# Patient Record
Sex: MALE | Race: BLACK OR AFRICAN AMERICAN | Employment: FULL TIME | URBAN - METROPOLITAN AREA
[De-identification: names, ages, dates, MRNs, and addresses within clinical notes are randomized per-mention and may not be internally consistent; named-entity substitution may affect disease eponyms.]

---

## 2018-09-18 ENCOUNTER — APPOINTMENT (OUTPATIENT)
Dept: GENERAL RADIOLOGY | Age: 28
End: 2018-09-18
Attending: EMERGENCY MEDICINE
Payer: SELF-PAY

## 2018-09-18 ENCOUNTER — HOSPITAL ENCOUNTER (EMERGENCY)
Age: 28
Discharge: HOME | End: 2018-09-18
Attending: EMERGENCY MEDICINE
Payer: SELF-PAY

## 2018-09-18 VITALS
WEIGHT: 184.3 LBS | DIASTOLIC BLOOD PRESSURE: 75 MMHG | HEART RATE: 59 BPM | RESPIRATION RATE: 16 BRPM | SYSTOLIC BLOOD PRESSURE: 133 MMHG | OXYGEN SATURATION: 100 % | HEIGHT: 76 IN | TEMPERATURE: 98.4 F | BODY MASS INDEX: 22.44 KG/M2

## 2018-09-18 DIAGNOSIS — R07.81 RIB PAIN ON LEFT SIDE: Primary | ICD-10-CM

## 2018-09-18 PROCEDURE — 99282 EMERGENCY DEPT VISIT SF MDM: CPT

## 2018-09-18 PROCEDURE — 71101 X-RAY EXAM UNILAT RIBS/CHEST: CPT

## 2018-09-18 RX ORDER — IBUPROFEN 800 MG/1
800 TABLET ORAL
Qty: 20 TAB | Refills: 0 | Status: SHIPPED | OUTPATIENT
Start: 2018-09-18 | End: 2018-09-25

## 2018-09-18 RX ORDER — HYDROCODONE BITARTRATE AND ACETAMINOPHEN 5; 325 MG/1; MG/1
1 TABLET ORAL
Qty: 10 TAB | Refills: 0 | Status: SHIPPED | OUTPATIENT
Start: 2018-09-18

## 2018-09-18 NOTE — DISCHARGE INSTRUCTIONS
[facility] SYSTEMS Departments     For adult and child immunizations, family planning, TB screening, STD testing and women's health services. [facility]: [place] [phone]      [facility] 25   [address]   [address]   170 [facility]: Elier Cadena [address] [facility] [address] [phone]      [address]          [address] [address]     For primary care services, woman and child wellness, and some clinics providing specialty care. [facility] -- [address] 2525 [facility] [phone]   411 [facility] 200 [facility] 3614 [facility] [phone]   339 [facility] [address] [phone]   [address] [place] [address] 5850  Community  [phone]   7700 [facility] [address] [address] [place] [phone]   [facility] 81 [facility] [phone]   [facility] [address] [place] [address] [phone]   [facility]: [facility] [address], ext [address] [place] [address], [address] [phone]     [place] [address] [facility] [address] [phone]   [facility] Outreach 5850 [facility]  [phone]   [employer]  [address], [address] ([email or web address]) [phone]         Sexual Health/Woman Wellness Clinics    For STD/HIV testing and treatment, pregnancy testing and services, men's health, birth control services, LGBT services, and hepatitis/HPV vaccine services. [employer] for [employer] 201 [address]. [facility] 75 [facility] [address] [facility] 1579 600 EPapa Jones [phone]   [facility] [address], 5th floor [phone]   Pregnancy [address] for Women [address]  [place] [phone]         Specialty Service 1702 [facility]   [phone]   [place]   [phone]   Women, Infant and Children's Services: [address] [phone] 600 [facility]   [phone]   [address]   [facility]     [phone]   [address] Crisis   [address] [phone]     Local Primary Care Physicians  [facility] [phone]  Nora Lofts, MD Cecillia Cera, MD Ritta Scheuermann, MD [facility] Doctors [phone]  Alicia Albarran, P  MD Nallely Monahan MD Pinkie Levers, MD [address]  [phone]  MD Vikram Zeng MD [address] [facility] [phone]  MD Migel Diallo, MD Thuan Stroud MD   [facility] [phone]  [facility], MD Ranulfo Hardin MD  Ronbob Austin, NP [address] [phone]  MD Lauren Malhotra MD Darrol Coco, MD Cherylynn Brow, MD Haskell Kerns, MD Barbara Corrigan, MD Earnestine Kotyk, MD   44 57 [facility]  Chikis Simpson MD [address] [phone]  MD Trent Bejarano, MD Keven Madrigal MD Margarete Raider, MD Marylou Inks, MD Faith Memo, MD   0746 [facility] [phone]  MD Ruby Talley, P  Anum Dia, NP  Marylen Delude, MD Gwynne Downy, MD Clifm Beals, MD Lauris Ganja, MD [facility] [phone]  MD Samaria Pathak MD Earline Sees, MD Benedict Rector, MD Jocelyne Guys, MD   [address] [phone]  MD Elmer Sorensen MD [facility] [phone]  MD Rigoberto Tillman MD Loney Silvers Ilda Chappell, [address] [facility]icians [phone]  Martita Hanna, MD Rory Contreras, MD Navarro Mobley, MD Rocky Haji, MD Heather Jhaveri, MD Vahid Aranda, NP  Akanksha Fernandez MD [address]19  66   [phone]  MD Pilo Dejesus MD Lonita Pac, MD   2102 [facility] [phone]  Nany Adorno, MD Gene Jones, FNP  Sherry Gifford, PAMARIAMA Valdess, FNP  Maribel Lebron PA-C  [facility], MD Antoinette Farris, NP   Shasta Mahmood, DO Miscellaneous:  Raven Alvarez [phone]

## 2018-09-18 NOTE — ED PROVIDER NOTES
EMERGENCY DEPARTMENT HISTORY AND PHYSICAL EXAM 
 
 
Date: 9/18/2018 Patient Name: Lesvia Jaeger History of Presenting Illness Chief Complaint Patient presents with  Rib Pain  
  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 male presents ambulatory to the ED with cc of 1 day of 6 out of 10 constant aching left rib pain that is worse with laughing and coughing. Denies injury. Denies fever or cough. Chief Complaint: left rib pain Duration: 1 Days Timing:  Constant Location: left lateral ribs Quality: Aching Severity: 6 out of 10 Modifying Factors: worse with laughing and coughing Associated Symptoms: denies any other associated signs or symptoms There are no other complaints, changes, or physical findings at this time. PCP: PROVIDER UNKNOWN 
 
 
Past History Past Medical History: 
History reviewed. No pertinent past medical history. Past Surgical History: 
History reviewed. No pertinent surgical history. Family History: 
Family History Problem Relation Age of Onset  Hypertension Father   
  borderline  Diabetes Maternal Aunt  Diabetes Paternal Grandfather Social History: 
Social History Substance Use Topics  Smoking status: Current Every Day Smoker Packs/day: 0.30 Years: 3.00 Types: Cigarettes  Smokeless tobacco: Never Used  Alcohol use Yes Comment: socially Allergies: 
No Known Allergies Review of Systems Review of Systems Constitutional: Negative for fatigue and fever. HENT: Negative for congestion, ear pain and rhinorrhea. Eyes: Negative for pain and redness. Respiratory: Negative for cough and wheezing. Left sided rib pain  
Cardiovascular: Negative for chest pain and palpitations. Gastrointestinal: Negative for abdominal pain, nausea and vomiting. Genitourinary: Negative for dysuria, frequency and urgency. Musculoskeletal: Negative for back pain, neck pain and neck stiffness. Skin: Negative for rash and wound. Neurological: Negative for weakness, light-headedness, numbness and headaches. All other systems reviewed and are negative. Physical Exam  
Physical Exam  
Constitutional: He is oriented to person, place, and time. He appears well-developed and well-nourished. Non-toxic appearance. No distress. HENT:  
Head: Normocephalic and atraumatic. Head is without right periorbital erythema and without left periorbital erythema. Right Ear: External ear normal.  
Left Ear: External ear normal.  
Nose: Nose normal.  
Mouth/Throat: Uvula is midline. No trismus in the jaw. Eyes: Conjunctivae and EOM are normal. Pupils are equal, round, and reactive to light. No scleral icterus. Neck: Normal range of motion and full passive range of motion without pain. Cardiovascular: Normal rate, regular rhythm and normal heart sounds. Pulmonary/Chest: Effort normal and breath sounds normal. No accessory muscle usage. No tachypnea. No respiratory distress. He has no decreased breath sounds. He has no wheezes. He exhibits bony tenderness. Symmetric, full chest wall expansion with deep inspiration. Breathing unlabored; lungs are clear No bruising, redness or swelling LEFT sided rib tenderness Abdominal: Soft. There is no tenderness. There is no rigidity and no guarding. Musculoskeletal: Normal range of motion. Neurological: He is alert and oriented to person, place, and time. He is not disoriented. No cranial nerve deficit or sensory deficit. GCS eye subscore is 4. GCS verbal subscore is 5. GCS motor subscore is 6. Skin: Skin is intact. No rash noted. Psychiatric: He has a normal mood and affect. His speech is normal.  
Nursing note and vitals reviewed. Diagnostic Study Results Labs - 
 No results found for this or any previous visit (from the past 12 hour(s)). Radiologic Studies -  
XR RIBS LT W PA CXR MIN 3 V Final Result CT Results  (Last 48 hours) None CXR Results  (Last 48 hours) None Medical Decision Making I am the first provider for this patient. I reviewed the vital signs, available nursing notes, past medical history, past surgical history, family history and social history. Vital Signs-Reviewed the patient's vital signs. Patient Vitals for the past 12 hrs: 
 Temp Pulse Resp BP SpO2  
09/18/18 1315 98.4 °F (36.9 °C) (!) 59 16 133/75 100 % Pulse Oximetry Analysis - 100% on RA Records Reviewed: Nursing Notes, Old Medical Records, Previous Radiology Studies, Previous Laboratory Studies and  Provider Notes (Medical Decision Making): DDx: fracture, pneumothorax, costochondritis, strain ED Course:  
Initial assessment performed. The patients presenting problems have been discussed, and they are in agreement with the care plan formulated and outlined with them. I have encouraged them to ask questions as they arise throughout their visit. Disposition: 
Discharge PLAN: 
1. Current Discharge Medication List  
  
START taking these medications Details HYDROcodone-acetaminophen (NORCO) 5-325 mg per tablet Take 1 Tab by mouth every four (4) hours as needed for Pain. Max Daily Amount: 6 Tabs. Qty: 10 Tab, Refills: 0 Associated Diagnoses: Rib pain on left side  
  
ibuprofen (MOTRIN) 800 mg tablet Take 1 Tab by mouth every eight (8) hours as needed for Pain for up to 7 days. Qty: 20 Tab, Refills: 0  
  
  
 
2. Follow-up Information Follow up With Details Comments Contact Vielka Flores Schedule an appointment as soon as possible for a visit PRIMARY CARE: call to schedule follow up [address] Return to ED if worse Diagnosis Clinical Impression: 1. Rib pain on left side 8:13 PM 
I was personally available for consultation in the emergency department. I have reviewed the chart and agree with the documentation recorded by the [facility], including the assessment, treatment plan, and disposition.  
Gregg Fernandez MD

## 2018-09-18 NOTE — LETTER
[address] 
[facility] EMERGENCY DEPT 
[address] [place] [address] [address] [id number] [phone] Work/School Note Date: 9/18/2018 To Whom It May concern: 
 
Brianne Tolbert III was seen and treated today in the emergency room by the following provider(s): 
Attending Provider: Anshul Lewis MD 
Physician Assistant: RAMÓN Vale. Cole Floyd may return to work on [phone]. Sincerely, RAMÓN Vale

## 2023-12-01 ENCOUNTER — HOSPITAL ENCOUNTER (EMERGENCY)
Facility: HOSPITAL | Age: 33
Discharge: HOME | End: 2023-12-01
Attending: EMERGENCY MEDICINE

## 2023-12-01 VITALS
TEMPERATURE: 98.4 F | DIASTOLIC BLOOD PRESSURE: 89 MMHG | WEIGHT: 231.7 LBS | BODY MASS INDEX: 28.22 KG/M2 | OXYGEN SATURATION: 98 % | HEART RATE: 65 BPM | HEIGHT: 76 IN | RESPIRATION RATE: 20 BRPM | SYSTOLIC BLOOD PRESSURE: 138 MMHG

## 2023-12-01 DIAGNOSIS — S05.02XA LEFT CORNEAL ABRASION, INITIAL ENCOUNTER: Primary | ICD-10-CM

## 2023-12-01 PROCEDURE — 6360000002 HC RX W HCPCS: Performed by: EMERGENCY MEDICINE

## 2023-12-01 PROCEDURE — 6370000000 HC RX 637 (ALT 250 FOR IP): Performed by: EMERGENCY MEDICINE

## 2023-12-01 PROCEDURE — 90714 TD VACC NO PRESV 7 YRS+ IM: CPT | Performed by: EMERGENCY MEDICINE

## 2023-12-01 PROCEDURE — 90471 IMMUNIZATION ADMIN: CPT | Performed by: EMERGENCY MEDICINE

## 2023-12-01 PROCEDURE — 99284 EMERGENCY DEPT VISIT MOD MDM: CPT

## 2023-12-01 RX ORDER — TETANUS AND DIPHTHERIA TOXOIDS ADSORBED 2; 2 [LF]/.5ML; [LF]/.5ML
0.5 INJECTION INTRAMUSCULAR ONCE
Status: COMPLETED | OUTPATIENT
Start: 2023-12-01 | End: 2023-12-01

## 2023-12-01 RX ORDER — GENTAMICIN SULFATE 3 MG/ML
1 SOLUTION/ DROPS OPHTHALMIC EVERY 4 HOURS
Qty: 5 ML | Refills: 0 | Status: SHIPPED | OUTPATIENT
Start: 2023-12-01 | End: 2023-12-04

## 2023-12-01 RX ORDER — TETRACAINE HYDROCHLORIDE 5 MG/ML
1 SOLUTION OPHTHALMIC
Status: COMPLETED | OUTPATIENT
Start: 2023-12-01 | End: 2023-12-01

## 2023-12-01 RX ADMIN — TETANUS AND DIPHTHERIA TOXOIDS ADSORBED 0.5 ML: 2; 2 INJECTION INTRAMUSCULAR at 10:59

## 2023-12-01 RX ADMIN — FLUORESCEIN SODIUM 1 MG: 1 STRIP OPHTHALMIC at 11:01

## 2023-12-01 RX ADMIN — TETRACAINE HYDROCHLORIDE 1 DROP: 5 SOLUTION OPHTHALMIC at 11:01

## 2023-12-01 ASSESSMENT — VISUAL ACUITY
OS: 20/20
OU: 20/20
OD: 20/25

## 2023-12-01 ASSESSMENT — ENCOUNTER SYMPTOMS
EYE PAIN: 1
EYE DISCHARGE: 1
SHORTNESS OF BREATH: 0
RHINORRHEA: 0
ABDOMINAL PAIN: 0
BACK PAIN: 0
NAUSEA: 0
VOMITING: 0
DIARRHEA: 0
COUGH: 0

## 2023-12-01 NOTE — ED PROVIDER NOTES
[facility] EMERGENCY DEP  EMERGENCY DEPARTMENT ENCOUNTER      Pt Name: Sabine Green III  MRN: [id number]  [address] [place] 1990  Date of evaluation: 12/1/2023  Provider: TOM Bruno NP    [address]       Chief Complaint   Patient presents with    Eye Injury         HISTORY OF PRESENT ILLNESS   (Location/Symptom, Timing/Onset, Context/Setting, Quality, Duration, Modifying Factors, Severity)  Note limiting factors. HPI  Patient is a 52-year-old male with no significant past medical history who presents to the ED with a left eye foreign body sensation for an injury he sustained yesterday while playing with his twin boys. He states he was accidentally scratched in his left eye by one of their fingers. He was evaluated at the work clinic today and sent to the ED for further evaluation. He does not wear contact lenses or corrective eyewear. He has not had any medications today prior to arrival.  He does not know when his last tetanus was. Denies any visual changes. Rt 20/25; Lt 20/20 without corrective eyewear. Review of External Medical Records:     Nursing Notes were reviewed. REVIEW OF SYSTEMS    (2-9 systems for level 4, 10 or more for level 5)     Review of Systems   Constitutional:  Negative for activity change, appetite change, fever and unexpected weight change. HENT:  Negative for congestion and rhinorrhea. Eyes:  Positive for pain and discharge. Respiratory:  Negative for cough and shortness of breath. Cardiovascular:  Negative for chest pain, palpitations and leg swelling. Gastrointestinal:  Negative for abdominal pain, diarrhea, nausea and vomiting. Genitourinary:  Negative for dysuria. Musculoskeletal:  Negative for back pain. Skin:  Negative for rash. Neurological:  Negative for dizziness, light-headedness and headaches. All other systems reviewed and are negative. Except as noted above the remainder of the review of systems was reviewed and negative.

## 2023-12-01 NOTE — ED TRIAGE NOTES
Pt comes to ED with reports of scratching his left eye last night. Reports pain, blurriness, and drainage. Rossi Wilson NP assessing pt in triage.